# Patient Record
Sex: FEMALE | Race: WHITE | URBAN - METROPOLITAN AREA
[De-identification: names, ages, dates, MRNs, and addresses within clinical notes are randomized per-mention and may not be internally consistent; named-entity substitution may affect disease eponyms.]

---

## 2017-06-23 ENCOUNTER — OFFICE VISIT (OUTPATIENT)
Dept: FAMILY MEDICINE | Facility: CLINIC | Age: 32
End: 2017-06-23
Payer: COMMERCIAL

## 2017-06-23 VITALS
HEIGHT: 64 IN | WEIGHT: 144 LBS | TEMPERATURE: 98.9 F | DIASTOLIC BLOOD PRESSURE: 76 MMHG | HEART RATE: 91 BPM | SYSTOLIC BLOOD PRESSURE: 125 MMHG | BODY MASS INDEX: 24.59 KG/M2

## 2017-06-23 DIAGNOSIS — N93.9 ABNORMAL VAGINAL BLEEDING: ICD-10-CM

## 2017-06-23 DIAGNOSIS — Z30.9 ENCOUNTER FOR CONTRACEPTIVE MANAGEMENT, UNSPECIFIED CONTRACEPTIVE ENCOUNTER TYPE: Primary | ICD-10-CM

## 2017-06-23 LAB — BETA HCG QUAL IFA URINE: NEGATIVE

## 2017-06-23 PROCEDURE — 96372 THER/PROPH/DIAG INJ SC/IM: CPT | Performed by: FAMILY MEDICINE

## 2017-06-23 PROCEDURE — 84703 CHORIONIC GONADOTROPIN ASSAY: CPT | Performed by: FAMILY MEDICINE

## 2017-06-23 PROCEDURE — 99213 OFFICE O/P EST LOW 20 MIN: CPT | Mod: 25 | Performed by: FAMILY MEDICINE

## 2017-06-23 RX ORDER — MEDROXYPROGESTERONE ACETATE 150 MG/ML
150 INJECTION, SUSPENSION INTRAMUSCULAR
Qty: 1 ML | Refills: 0 | OUTPATIENT
Start: 2017-06-23 | End: 2017-09-13

## 2017-06-23 ASSESSMENT — PAIN SCALES - GENERAL: PAINLEVEL: NO PAIN (0)

## 2017-06-23 NOTE — PROGRESS NOTES
SUBJECTIVE:                                                    Yuliet Bernstein is a 31 year old female who presents to clinic today for the following health issues:       Wants to start dep shot    none    Problem list and histories reviewed & adjusted, as indicated.  Additional history: as documented         Reviewed and updated as needed this visit by clinical staff  Tobacco  Allergies  Meds  Problems       Reviewed and updated as needed this visit by Provider          a few years since depo shot     No problems in the past    Not using any birth control     Bled for 10 weeks after giving birth    March 24 had normal delivery    Feeling okay    At least two weeks since last intercourse          Full physical not done     Mentation and affect are fine    No tremor of speech or extremity    upt neg     ASSESSMENT / PLAN:  (Z30.9) Encounter for contraceptive management, unspecified contraceptive encounter type  (primary encounter diagnosis)  Comment: upt neg and over two weeks since last intercourse.  Thus okay to start depo.  Given.  I did advise using condoms for 1st few weeks in addition.  Depo q 3 months.   Plan: Beta HCG qual IFA urine, Medroxyprogesterone         inj  1mg   (Depo Provera J-Code), INJECTION         INTRAMUSCULAR OR SUB-Q, medroxyPROGESTERone         (DEPO-PROVERA) 150 MG/ML injection             (N93.9) Abnormal vaginal bleeding  Comment: patient did have prolonged bleeding after delivery but that has now resolved.  Plan: follow up prn       I reviewed the patient's medications, allergies, medical history, family history, and social history.    Pelon Daugherty MD

## 2017-06-23 NOTE — MR AVS SNAPSHOT
"              After Visit Summary   6/23/2017    Yuliet Bernstein    MRN: [id number]           Patient Information     Date Of Birth          1985        Visit Information        Provider Department      6/23/2017 3:20 PM Pelon Daugherty MD [facility]        Today's Diagnoses     Contraceptive management    -  1      Care Instructions    Depo every 3 months    Call with problems / questions          Follow-ups after your visit        Who to contact     If you have questions or need follow up information about today's clinic visit or your schedule please contact [facility] directly at [phone].  Normal or non-critical lab and imaging results will be communicated to you by [employer], letter or phone within 4 business days after the clinic has received the results. If you do not hear from us within 7 days, please contact the clinic through [employer] or phone. If you have a critical or abnormal lab result, we will notify you by phone as soon as possible.  Submit refill requests through [employer] Buddy or call your pharmacy and they will forward the refill request to us. Please allow 3 business days for your refill to be completed.          Additional Information About Your Visit        MyChart Information     [employer]dy gives you secure access to your electronic health record. If you see a primary care provider, you can also send messages to your care team and make appointments. If you have questions, please call your primary care clinic.  If you do not have a primary care provider, please call [phone] and they will assist you.        Care EveryWhere ID     This is your Care EveryWhere ID. This could be used by other organizations to access your [place] medical records  [id number]        Your Vitals Were     Pulse Temperature Height Breastfeeding? BMI (Body Mass Index)       91 98.9  F (37.2  C) (Oral) 5' 4\" (1.626 m) No 24.72 kg/m2        Blood Pressure from Last 3 " Encounters:   06/23/17 125/76   09/23/10 109/73   04/29/10 105/69    Weight from Last 3 Encounters:   06/23/17 144 lb (65.3 kg)   09/23/10 138 lb (62.6 kg)   04/29/10 144 lb (65.3 kg)              We Performed the Following     Beta HCG qual IFA urine        Primary Care Provider Office Phone # Fax #    Rosalie Annie Ace [phone]       [facility] [address] [facility] [address]        Equal Access to Services     [facility] MATT : Hadii rufina malcolm hadasho Sofranklin, waaxda luqadaha, qaybta kaalmada adejj, kiersten sawant . So [facility] [place] [facility] [phone].    ATENCIÓN: Si habla español, tiene a guajardo disposición servicios gratuitos de asistencia lingüística. [facility] [phone].    We comply with applicable federal civil rights laws and [place] laws. We do not discriminate on the basis of race, color, national origin, age, disability sex, sexual orientation or gender identity.            Thank you!     Thank you for choosing [facility]  for your care. Our goal is always to provide you with excellent care. Hearing back from our patients is one way we can continue to improve our services. Please take a few minutes to complete the written survey that you may receive in the mail after your visit with us. Thank you!             Your Updated Medication List - Protect others around you: Learn how to safely use, store and throw away your medicines at www.disposemymeds.org.      Notice  As of 6/23/2017  3:55 PM    You have not been prescribed any medications.

## 2017-06-23 NOTE — NURSING NOTE
The following medication was given:     MEDICATION: Depo Provera 150mg  ROUTE: IM  SITE: LUQ - Gluteus  DOSE: 150 mg  LOT #: [id number]  :  [employer]   EXPIRATION DATE:  09/2019  NDC#: [address]-4  Next injection is due between 09-08 and 09/22/2017  Next pap is due on 08/2018. Patient had her last one 08/2016  Vickie Nunez CMA

## 2017-06-23 NOTE — NURSING NOTE
"Chief Complaint   Patient presents with     Contraception     Health Maintenance       Initial /76 (BP Location: Right arm, Patient Position: Chair, Cuff Size: Adult Regular)  Pulse 91  Temp 98.9  F (37.2  C) (Oral)  Ht 5' 4\" (1.626 m)  Wt 144 lb (65.3 kg)  Breastfeeding? No  BMI 24.72 kg/m2 Estimated body mass index is 24.72 kg/(m^2) as calculated from the following:    Height as of this encounter: 5' 4\" (1.626 m).    Weight as of this encounter: 144 lb (65.3 kg).  Medication Reconciliation: complete   Vickie Nunez CMA      "

## 2017-09-13 ENCOUNTER — ALLIED HEALTH/NURSE VISIT (OUTPATIENT)
Dept: NURSING | Facility: CLINIC | Age: 32
End: 2017-09-13
Payer: COMMERCIAL

## 2017-09-13 VITALS — SYSTOLIC BLOOD PRESSURE: 121 MMHG | DIASTOLIC BLOOD PRESSURE: 88 MMHG | WEIGHT: 149 LBS | BODY MASS INDEX: 25.58 KG/M2

## 2017-09-13 DIAGNOSIS — Z30.9 ENCOUNTER FOR CONTRACEPTIVE MANAGEMENT, UNSPECIFIED TYPE: ICD-10-CM

## 2017-09-13 PROCEDURE — 99207 ZZC NO CHARGE NURSE ONLY: CPT

## 2017-09-13 PROCEDURE — 96372 THER/PROPH/DIAG INJ SC/IM: CPT

## 2017-09-13 RX ORDER — MEDROXYPROGESTERONE ACETATE 150 MG/ML
150 INJECTION, SUSPENSION INTRAMUSCULAR
Qty: 1 ML | Refills: 3 | OUTPATIENT
Start: 2017-09-13 | End: 2018-09-12

## 2017-09-13 NOTE — NURSING NOTE
>> KARIN TADEO Sep 13, 2017  2:59 PM  The following medication was given:     MEDICATION: Depo Provera 150mg  ROUTE: IM  SITE: LUQ - Gluteus  DOSE: 150MG/ML  LOT #: [id number]  :  [employer]   EXPIRATION DATE:  9/2019  Karin Tadeo ma    NDC#: [id number]     NEXT INJECTION DUE-November 29-DEC 12

## 2017-09-13 NOTE — MR AVS SNAPSHOT
After Visit Summary   9/13/2017    Yuliet Bernstein    MRN: [id number]           Patient Information     Date Of Birth          1985        Visit Information        Provider Department      9/13/2017 3:00 PM CP ANCILLARY [facility]        Today's Diagnoses     Contraception    -  1    Encounter for contraceptive management, unspecified type           Follow-ups after your visit        Who to contact     If you have questions or need follow up information about today's clinic visit or your schedule please contact [facility] directly at [phone].  Normal or non-critical lab and imaging results will be communicated to you by PlayRavenhart, letter or phone within 4 business days after the clinic has received the results. If you do not hear from us within 7 days, please contact the clinic through [employer] or phone. If you have a critical or abnormal lab result, we will notify you by phone as soon as possible.  Submit refill requests through [employer]Cast or call your pharmacy and they will forward the refill request to us. Please allow 3 business days for your refill to be completed.          Additional Information About Your Visit        MyChart Information     [employer] gives you secure access to your electronic health record. If you see a primary care provider, you can also send messages to your care team and make appointments. If you have questions, please call your primary care clinic.  If you do not have a primary care provider, please call [phone] and they will assist you.        Care EveryWhere ID     This is your Care EveryWhere ID. This could be used by other organizations to access your [place] medical records  [id number]        Your Vitals Were     BMI (Body Mass Index)                   25.58 kg/m2            Blood Pressure from Last 3 Encounters:   09/13/17 121/88   06/23/17 125/76   09/23/10 109/73    Weight from Last 3 Encounters:   09/13/17 149  lb (67.6 kg)   06/23/17 144 lb (65.3 kg)   09/23/10 138 lb (62.6 kg)              We Performed the Following     INJECTION INTRAMUSCULAR OR SUB-Q     Medroxyprogesterone inj  1mg   (Depo Provera J-Code)          Where to get your medicines      Some of these will need a paper prescription and others can be bought over the counter.  Ask your nurse if you have questions.     You don't need a prescription for these medications     medroxyPROGESTERone 150 MG/ML injection          Primary Care Provider Office Phone # Fax #    Rosalie Annie Ace [phone] [address] [facility] [address]        Equal Access to Services     TRESSA GUTIERREZ : Carline Larsen, warafiq quiñones, qahannah kaalmada noah, kiersten cedeño. So [facility] [phone].    ATENCIÓN: Si habla español, tiene a guajardo disposición servicios gratuitos de asistencia lingüística. Llame al [phone].    We comply with applicable federal civil rights laws and [place] laws. We do not discriminate on the basis of race, color, national origin, age, disability sex, sexual orientation or gender identity.            Thank you!     Thank you for choosing [facility]  for your care. Our goal is always to provide you with excellent care. Hearing back from our patients is one way we can continue to improve our services. Please take a few minutes to complete the written survey that you may receive in the mail after your visit with us. Thank you!             Your Updated Medication List - Protect others around you: Learn how to safely use, store and throw away your medicines at www.disposemymeds.org.          This list is accurate as of: 9/13/17  3:04 PM.  Always use your most recent med list.                   Brand Name Dispense Instructions for use Diagnosis    medroxyPROGESTERone 150 MG/ML injection    DEPO-PROVERA    1 mL    Inject 1 mL (150 mg) into the muscle every 3 months     Encounter for contraceptive management, unspecified type

## 2019-10-01 PROBLEM — Z78.9 USES BIRTH CONTROL: Status: ACTIVE | Noted: 2017-09-13

## 2020-02-23 ENCOUNTER — HEALTH MAINTENANCE LETTER (OUTPATIENT)
Age: 35
End: 2020-02-23